# Patient Record
Sex: MALE | Race: AMERICAN INDIAN OR ALASKA NATIVE
[De-identification: names, ages, dates, MRNs, and addresses within clinical notes are randomized per-mention and may not be internally consistent; named-entity substitution may affect disease eponyms.]

---

## 2020-02-18 ENCOUNTER — HOSPITAL ENCOUNTER (EMERGENCY)
Dept: HOSPITAL 5 - ED | Age: 25
Discharge: HOME | End: 2020-02-18
Payer: COMMERCIAL

## 2020-02-18 VITALS — SYSTOLIC BLOOD PRESSURE: 129 MMHG | DIASTOLIC BLOOD PRESSURE: 46 MMHG

## 2020-02-18 DIAGNOSIS — Z79.899: ICD-10-CM

## 2020-02-18 DIAGNOSIS — B34.9: Primary | ICD-10-CM

## 2020-02-18 DIAGNOSIS — R42: ICD-10-CM

## 2020-02-18 DIAGNOSIS — J40: ICD-10-CM

## 2020-02-18 LAB
ALBUMIN SERPL-MCNC: 4.4 G/DL (ref 3.9–5)
ALT SERPL-CCNC: 46 UNITS/L (ref 7–56)
BASOPHILS # (AUTO): 0 K/MM3 (ref 0–0.1)
BASOPHILS NFR BLD AUTO: 0.5 % (ref 0–1.8)
BUN SERPL-MCNC: 13 MG/DL (ref 9–20)
BUN/CREAT SERPL: 16 %
CALCIUM SERPL-MCNC: 9.6 MG/DL (ref 8.4–10.2)
EOSINOPHIL # BLD AUTO: 0.1 K/MM3 (ref 0–0.4)
EOSINOPHIL NFR BLD AUTO: 1.3 % (ref 0–4.3)
HCT VFR BLD CALC: 42.9 % (ref 35.5–45.6)
HEMOLYSIS INDEX: 2
HGB BLD-MCNC: 14.8 GM/DL (ref 11.8–15.2)
LYMPHOCYTES # BLD AUTO: 1.6 K/MM3 (ref 1.2–5.4)
LYMPHOCYTES NFR BLD AUTO: 40.1 % (ref 13.4–35)
MCHC RBC AUTO-ENTMCNC: 34 % (ref 32–34)
MCV RBC AUTO: 87 FL (ref 84–94)
MONOCYTES # (AUTO): 0.4 K/MM3 (ref 0–0.8)
MONOCYTES % (AUTO): 10.9 % (ref 0–7.3)
PLATELET # BLD: 327 K/MM3 (ref 140–440)
RBC # BLD AUTO: 4.94 M/MM3 (ref 3.65–5.03)

## 2020-02-18 PROCEDURE — 36415 COLL VENOUS BLD VENIPUNCTURE: CPT

## 2020-02-18 PROCEDURE — 71046 X-RAY EXAM CHEST 2 VIEWS: CPT

## 2020-02-18 PROCEDURE — 93005 ELECTROCARDIOGRAM TRACING: CPT

## 2020-02-18 PROCEDURE — 99283 EMERGENCY DEPT VISIT LOW MDM: CPT

## 2020-02-18 PROCEDURE — 93010 ELECTROCARDIOGRAM REPORT: CPT

## 2020-02-18 PROCEDURE — 85025 COMPLETE CBC W/AUTO DIFF WBC: CPT

## 2020-02-18 PROCEDURE — 80053 COMPREHEN METABOLIC PANEL: CPT

## 2020-02-18 NOTE — EMERGENCY DEPARTMENT REPORT
- General


Chief Complaint: Dizziness


Stated Complaint: FEELS SICK


Time Seen by Provider: 02/18/20 19:07


Source: patient


Mode of arrival: Ambulatory


Limitations: No Limitations





- History of Present Illness


MD Complaint: cough, rhinorrhea, other (nausea and dizziness)


-: Sudden


Severity: mild


Consistency: constant


Improves With: nothing


Worsens With: nothing


Associated Symptoms: denies other symptoms, cough, nausea, other ([id number])





- Related Data


                                  Previous Rx's











 Medication  Instructions  Recorded  Last Taken  Type


 


Ondansetron [Zofran Odt] 4 mg PO Q8HR #20 tab.rapdis 02/18/20 Unknown Rx


 


guaiFENesin/CODEINE [Robitussin AC] 5 ml PO Q8HR #120 oral.liqd 02/18/20 Unknown

Rx


 


predniSONE [Deltasone] 50 mg PO QDAY #5 tab 02/18/20 Unknown Rx











                                    Allergies











Allergy/AdvReac Type Severity Reaction Status Date / Time


 


No Known Allergies Allergy   Unverified 02/18/20 17:48














ED Review of Systems


ROS: 


Stated complaint: FEELS SICK


Other details as noted in HPI





Comment: All other systems reviewed and negative





ED Past Medical Hx





- Past Medical History


Previous Medical History?: No





- Surgical History


Past Surgical History?: No





- Social History


Smoking Status: Never Smoker


Substance Use Type: None





- Medications


Home Medications: 


                                Home Medications











 Medication  Instructions  Recorded  Confirmed  Last Taken  Type


 


Ondansetron [Zofran Odt] 4 mg PO Q8HR #20 tab.rapdis 02/18/20  Unknown Rx


 


guaiFENesin/CODEINE [Robitussin AC] 5 ml PO Q8HR #120 oral.liqd 02/18/20  

Unknown Rx


 


predniSONE [Deltasone] 50 mg PO QDAY #5 tab 02/18/20  Unknown Rx














ED Physical Exam





- General


Limitations: No Limitations


General appearance: alert, in no apparent distress





- Head


Head exam: Present: atraumatic, normocephalic





- Eye


Eye exam: Present: normal appearance, PERRL, EOMI


Pupils: Present: normal accommodation





- ENT


ENT exam: Present: normal exam, normal orophraynx, mucous membranes moist, TM's 

normal bilaterally, other (Small effusion behind the left tympanic membrane.  No

 signs of any perforation or infection are noted.)





- Neck


Neck exam: Present: normal inspection, full ROM, lymphadenopathy.  Absent: 

tenderness, meningismus





- Respiratory


Respiratory exam: Present: normal lung sounds bilaterally.  Absent: respiratory 

distress, wheezes, chest wall tenderness, accessory muscle use





- Cardiovascular


Cardiovascular Exam: Present: regular rate, normal rhythm.  Absent: systolic 

murmur, diastolic murmur, rubs, gallop





- GI/Abdominal


GI/Abdominal exam: Present: soft, normal bowel sounds.  Absent: distended, 

tenderness, hyperactive bowel sounds, hypoactive bowel sounds, organomegaly





- Rectal


Rectal exam: Present: deferred





- Extremities Exam


Extremities exam: Present: normal inspection





- Back Exam


Back exam: Present: normal inspection.  Absent: CVA tenderness (R), CVA 

tenderness (L)





- Neurological Exam


Neurological exam: Present: alert, oriented X3





- Psychiatric


Psychiatric exam: Present: normal affect, normal mood





- Skin


Skin exam: Present: warm, dry, intact, normal color.  Absent: rash





ED Course





                                   Vital Signs











  02/18/20 02/18/20





  17:50 20:06


 


Temperature 98.1 F 98.1 F


 


Pulse Rate 76 69


 


Respiratory 18 15





Rate  


 


Blood Pressure 142/95 129/46


 


O2 Sat by Pulse 99 96





Oximetry  














ED Medical Decision Making





- Lab Data


Result diagrams: 


                                 02/18/20 19:49





                                 02/18/20 19:49





- Radiology Data


Radiology results: report reviewed





Chest x-ray shows no acute processes


Critical care attestation.: 


If time is entered above; I have spent that time in minutes in the direct care 

of this critically ill patient, excluding procedure time.








ED Disposition


Clinical Impression: 


 Dizziness, Bronchitis, Viral syndrome





Disposition: DC-01 TO HOME OR SELFCARE


Is pt being admited?: No


Does the pt Need Aspirin: No


Condition: Stable


Instructions:  Chronic Bronchitis (ED), Acute Bronchitis (ED), Upper Respiratory

 Infection (ED), Cold Symptoms (ED)


Prescriptions: 


predniSONE [Deltasone] 50 mg PO QDAY #5 tab


guaiFENesin/CODEINE [Robitussin AC] 5 ml PO Q8HR #120 oral.liqd


Ondansetron [Zofran Odt] 4 mg PO Q8HR #20 tab.rapdis


Referrals: 


YI BROWN MD [Staff Physician] - 3-5 Days


PRIMARY CARE,MD [Primary Care Provider] - 3-5 Days

## 2020-02-18 NOTE — XRAY REPORT
CHEST 2 VIEWS 



INDICATION / CLINICAL INFORMATION:

MAIN: cough and hemoptysis; Felling dizzy, nausea and vomiting. 



COMPARISON: 

None available.



FINDINGS:



SUPPORT DEVICES: None.

HEART / MEDIASTINUM: No significant abnormality. 

LUNGS / PLEURA: No significant pulmonary or pleural abnormality. No pneumothorax. 



ADDITIONAL FINDINGS: No significant additional findings.



IMPRESSION:

1. No acute findings. 



Signer Name: Mahesh Galarza MD 

Signed: 2/18/2020 9:05 PM

 Workstation Name: [id number]

## 2020-02-18 NOTE — EVENT NOTE
ED Screening Note


ED Screening Note: 








states that every time he stands up he feels dizziness


states it feels like room is spinning


+dry cough began yesterday


two episodes of vomiting yesterday, +diarrhea


no fever


PMHx none


no allergies to meds


+sick contact with brother 





non smoker


non drinker


no drug use





This initial assessment/diagnostic orders/clinical plan/treatment(s) is/are 

subject to change based on patients health status, clinical progression and re-

assessment by fellow clinical providers in the ED. Further treatment and workup 

at subsequent clinical providers discretion. Patient/guardian urged not to elope

from the ED as their condition may be serious if not clinically assessed and 

managed. 





Initial orders include: labs, EKG